# Patient Record
Sex: MALE | Race: WHITE | NOT HISPANIC OR LATINO | Employment: FULL TIME | RURAL
[De-identification: names, ages, dates, MRNs, and addresses within clinical notes are randomized per-mention and may not be internally consistent; named-entity substitution may affect disease eponyms.]

---

## 2022-08-16 ENCOUNTER — OFFICE VISIT (OUTPATIENT)
Dept: FAMILY MEDICINE CLINIC | Facility: CLINIC | Age: 37
End: 2022-08-16

## 2022-08-16 VITALS
RESPIRATION RATE: 20 BRPM | HEART RATE: 87 BPM | DIASTOLIC BLOOD PRESSURE: 70 MMHG | SYSTOLIC BLOOD PRESSURE: 124 MMHG | WEIGHT: 286.4 LBS | OXYGEN SATURATION: 98 % | BODY MASS INDEX: 38.79 KG/M2 | TEMPERATURE: 98.2 F | HEIGHT: 72 IN

## 2022-08-16 DIAGNOSIS — Z13.220 SCREENING FOR HYPERLIPIDEMIA: ICD-10-CM

## 2022-08-16 DIAGNOSIS — R53.83 FATIGUE, UNSPECIFIED TYPE: ICD-10-CM

## 2022-08-16 DIAGNOSIS — Z00.01 ENCOUNTER FOR GENERAL ADULT MEDICAL EXAMINATION WITH ABNORMAL FINDINGS: Primary | ICD-10-CM

## 2022-08-16 DIAGNOSIS — Z13.1 SCREENING FOR DIABETES MELLITUS: ICD-10-CM

## 2022-08-16 DIAGNOSIS — R20.2 PARESTHESIAS: ICD-10-CM

## 2022-08-16 DIAGNOSIS — B00.1 FEVER BLISTER: ICD-10-CM

## 2022-08-16 DIAGNOSIS — E55.9 VITAMIN D DEFICIENCY: ICD-10-CM

## 2022-08-16 DIAGNOSIS — E29.1 TESTICULAR HYPOFUNCTION: ICD-10-CM

## 2022-08-16 DIAGNOSIS — G47.19 EXCESSIVE DAYTIME SLEEPINESS: ICD-10-CM

## 2022-08-16 DIAGNOSIS — G56.22 NEURITIS OF LEFT ULNAR NERVE: ICD-10-CM

## 2022-08-16 DIAGNOSIS — N52.1 ERECTILE DYSFUNCTION DUE TO DISEASES CLASSIFIED ELSEWHERE: ICD-10-CM

## 2022-08-16 PROCEDURE — 99395 PREV VISIT EST AGE 18-39: CPT | Performed by: FAMILY MEDICINE

## 2022-08-16 PROCEDURE — 36415 COLL VENOUS BLD VENIPUNCTURE: CPT | Performed by: FAMILY MEDICINE

## 2022-08-16 RX ORDER — PREDNISONE 20 MG/1
TABLET ORAL
Qty: 15 TABLET | Refills: 0 | Status: SHIPPED | OUTPATIENT
Start: 2022-08-16 | End: 2023-02-16

## 2022-08-16 RX ORDER — VALACYCLOVIR HYDROCHLORIDE 1 G/1
1000 TABLET, FILM COATED ORAL AS NEEDED
COMMUNITY
Start: 2022-06-15

## 2022-08-16 RX ORDER — SILDENAFIL CITRATE 20 MG/1
TABLET ORAL
Qty: 50 TABLET | Refills: 2 | Status: SHIPPED | OUTPATIENT
Start: 2022-08-16

## 2022-08-17 PROBLEM — N52.1 ERECTILE DYSFUNCTION DUE TO DISEASES CLASSIFIED ELSEWHERE: Status: ACTIVE | Noted: 2022-08-17

## 2022-08-17 PROBLEM — B00.1 FEVER BLISTER: Status: ACTIVE | Noted: 2022-08-17

## 2022-08-17 PROBLEM — E29.1 TESTICULAR HYPOFUNCTION: Status: ACTIVE | Noted: 2022-08-17

## 2022-08-17 LAB
25(OH)D3+25(OH)D2 SERPL-MCNC: 35.2 NG/ML (ref 30–100)
ALBUMIN SERPL-MCNC: 4.7 G/DL (ref 4–5)
ALBUMIN/GLOB SERPL: 1.6 {RATIO} (ref 1.2–2.2)
ALP SERPL-CCNC: 70 IU/L (ref 44–121)
ALT SERPL-CCNC: 34 IU/L (ref 0–44)
AST SERPL-CCNC: 27 IU/L (ref 0–40)
BASOPHILS # BLD AUTO: 0.1 X10E3/UL (ref 0–0.2)
BASOPHILS NFR BLD AUTO: 1 %
BILIRUB SERPL-MCNC: 0.5 MG/DL (ref 0–1.2)
BUN SERPL-MCNC: 14 MG/DL (ref 6–20)
BUN/CREAT SERPL: 14 (ref 9–20)
CALCIUM SERPL-MCNC: 9.9 MG/DL (ref 8.7–10.2)
CHLORIDE SERPL-SCNC: 100 MMOL/L (ref 96–106)
CHOLEST SERPL-MCNC: 176 MG/DL (ref 100–199)
CO2 SERPL-SCNC: 22 MMOL/L (ref 20–29)
CREAT SERPL-MCNC: 1.02 MG/DL (ref 0.76–1.27)
EGFRCR-CYS SERPLBLD CKD-EPI 2021: 98 ML/MIN/1.73
EOSINOPHIL # BLD AUTO: 0.2 X10E3/UL (ref 0–0.4)
EOSINOPHIL NFR BLD AUTO: 3 %
ERYTHROCYTE [DISTWIDTH] IN BLOOD BY AUTOMATED COUNT: 12.6 % (ref 11.6–15.4)
GLOBULIN SER CALC-MCNC: 3 G/DL (ref 1.5–4.5)
GLUCOSE SERPL-MCNC: 101 MG/DL (ref 65–99)
HBA1C MFR BLD: 5.7 % (ref 4.8–5.6)
HCT VFR BLD AUTO: 47.7 % (ref 37.5–51)
HDLC SERPL-MCNC: 39 MG/DL
HGB BLD-MCNC: 16.2 G/DL (ref 13–17.7)
IMM GRANULOCYTES # BLD AUTO: 0 X10E3/UL (ref 0–0.1)
IMM GRANULOCYTES NFR BLD AUTO: 0 %
LDLC SERPL CALC-MCNC: 114 MG/DL (ref 0–99)
LYMPHOCYTES # BLD AUTO: 2.5 X10E3/UL (ref 0.7–3.1)
LYMPHOCYTES NFR BLD AUTO: 32 %
MCH RBC QN AUTO: 30.8 PG (ref 26.6–33)
MCHC RBC AUTO-ENTMCNC: 34 G/DL (ref 31.5–35.7)
MCV RBC AUTO: 91 FL (ref 79–97)
MONOCYTES # BLD AUTO: 0.6 X10E3/UL (ref 0.1–0.9)
MONOCYTES NFR BLD AUTO: 8 %
NEUTROPHILS # BLD AUTO: 4.4 X10E3/UL (ref 1.4–7)
NEUTROPHILS NFR BLD AUTO: 56 %
PLATELET # BLD AUTO: 336 X10E3/UL (ref 150–450)
POTASSIUM SERPL-SCNC: 5 MMOL/L (ref 3.5–5.2)
PROT SERPL-MCNC: 7.7 G/DL (ref 6–8.5)
RBC # BLD AUTO: 5.26 X10E6/UL (ref 4.14–5.8)
SODIUM SERPL-SCNC: 140 MMOL/L (ref 134–144)
TRIGL SERPL-MCNC: 126 MG/DL (ref 0–149)
TSH SERPL DL<=0.005 MIU/L-ACNC: 1.75 UIU/ML (ref 0.45–4.5)
VIT B12 SERPL-MCNC: 647 PG/ML (ref 232–1245)
VLDLC SERPL CALC-MCNC: 23 MG/DL (ref 5–40)
WBC # BLD AUTO: 7.7 X10E3/UL (ref 3.4–10.8)

## 2022-08-18 ENCOUNTER — TELEPHONE (OUTPATIENT)
Dept: FAMILY MEDICINE CLINIC | Facility: CLINIC | Age: 37
End: 2022-08-18

## 2022-08-18 NOTE — TELEPHONE ENCOUNTER
IF PATIENT CALLS BACK I LEFT HIM A VOICEMAIL:    PATIENT HAS APPOINTMENT 08/22/2022 @ 1:00PM.  PLEASE BE THERE @ 12:45PM AND BRING INSURANCE, PICTURE ID AND LIST OF MEDS.    DR. LUCITA HERNÁNDEZ  [address] [place] [address] [address], [place] [id number]   [phone]  LEFT PATIENT A VOICEMAIL.

## 2022-08-18 NOTE — PROGRESS NOTES
"Chief Complaint  Annual Exam    Subjective      Rufino Smallwood presents to [facility]  History of Present Illness    Here for annual exam, has not had on ein several yrs. Father has DM and recently found out cirrhosis due to fatty liver disease. Pt has noticed over past few yrs it has become increasingly difficult to lose weight, even with seriuos efforts to do so. He does get tired in the daytime if not up moving around, and feels the urge to nap, but never gets sleepy riding or driving, wife denies signif. Snoring, and does not feel sleepy if he remains engaged in work. We discussed possibly hving sleep apnea test but he declines for now, understands the risks and will let me know if he changes his mind. Only other complaint is that he fell playing basketball a year ago and has had pain in left elbow with recurrent sx of ulnar neuritis, bnurning/tingling in elbow that radiate down to left pinky and ring finger, but sx have gotten worse over past month. No decrease in  or neck pain  Objective   Vital Signs:   Vitals:    08/16/22 1352   BP: 124/70   Pulse: 87   Resp: 20   Temp: 98.2 °F (36.8 °C)   SpO2: 98%   Weight: 130 kg (286 lb 6.4 oz)   Height: 182.9 cm (72\")      /70   Pulse 87   Temp 98.2 °F (36.8 °C)   Resp 20   Ht 182.9 cm (72\")   Wt 130 kg (286 lb 6.4 oz)   SpO2 98%   BMI 38.84 kg/m²     Body mass index is 38.84 kg/m².    Review of Systems   Constitutional: Positive for fatigue and unexpected weight gain. Negative for chills, fever and unexpected weight loss.   HENT: Negative for ear discharge, ear pain, facial swelling, hearing loss, mouth sores, nosebleeds, rhinorrhea, sinus pressure, sore throat, swollen glands, tinnitus, trouble swallowing and voice change.    Eyes: Negative for blurred vision, double vision, pain, redness and visual disturbance.   Respiratory: Negative for cough, chest tightness, shortness of breath, wheezing and stridor.    Cardiovascular: " Negative for chest pain, palpitations and leg swelling.        PND, orthopnea   Gastrointestinal: Negative for abdominal distention, abdominal pain, anal bleeding, blood in stool, constipation, diarrhea, nausea, vomiting, GERD and indigestion.        Dysphagia, odynophagia   Endocrine: Negative for polydipsia, polyphagia and polyuria.   Genitourinary: Positive for erectile dysfunction. Negative for difficulty urinating, dysuria, frequency, hematuria, testicular pain, urgency and urinary incontinence.   Musculoskeletal: Positive for arthralgias (left elbow). Negative for back pain, gait problem, joint swelling, myalgias, neck pain and neck stiffness.   Skin: Negative for rash, skin lesions (worrisome/suspicious) and bruise.   Allergic/Immunologic: Negative for food allergies.   Neurological: Positive for numbness. Negative for dizziness, tremors, seizures, syncope, facial asymmetry, speech difficulty, weakness, light-headedness, headache, memory problem and confusion.   Hematological: Negative for adenopathy. Does not bruise/bleed easily.   Psychiatric/Behavioral: Negative for sleep disturbance, suicidal ideas and depressed mood. The patient is not nervous/anxious.        Past History:  Medical History: has a past medical history of Concussion, Fever blister, Headache, migraine, Testicular hypofunction, and Variants of migraine.   Surgical History: has a past surgical history that includes Appendectomy (1998) and Inguinal hernia repair.   Family History: family history includes Breast cancer in his mother; Cirrhosis in his father; Diabetes in his father; Migraines in his father; Sleep apnea in his father.   Social History: reports that he has never smoked. He has never used smokeless tobacco. He reports that he does not use drugs.      Current Outpatient Medications:   •  valACYclovir (VALTREX) 1000 MG tablet, , Disp: , Rfl:   •  metFORMIN (Glucophage) 500 MG tablet, Take 1 tablet by mouth 2 (Two) Times a Day With  Meals. For PREDIABETES, Disp: 180 tablet, Rfl: 3  •  predniSONE (DELTASONE) 20 MG tablet, Take 2 qd x 5d, then 1 qd x 5d w/food for inflammation, Disp: 15 tablet, Rfl: 0  •  sildenafil (REVATIO) 20 MG tablet, Take 1 to 5 (20mg to 100mg) about 1 hour before intercourse prn, Disp: 50 tablet, Rfl: 2    Allergies: Patient has no known allergies.    Physical Exam  Constitutional:       General: He is not in acute distress.     Appearance: He is obese. He is not toxic-appearing.   HENT:      Head: Normocephalic and atraumatic.      Right Ear: Tympanic membrane, ear canal and external ear normal.      Left Ear: Tympanic membrane, ear canal and external ear normal.      Nose: Nose normal.      Mouth/Throat:      Mouth: Mucous membranes are moist.      Pharynx: Oropharynx is clear.   Eyes:      General: No scleral icterus.     Extraocular Movements: Extraocular movements intact.      Conjunctiva/sclera: Conjunctivae normal.      Pupils: Pupils are equal, round, and reactive to light.   Neck:      Vascular: No carotid bruit.   Cardiovascular:      Rate and Rhythm: Normal rate and regular rhythm.      Pulses: Normal pulses.      Heart sounds: Normal heart sounds. No murmur heard.    No gallop.   Pulmonary:      Effort: Pulmonary effort is normal.      Breath sounds: Normal breath sounds. No wheezing or rales.   Chest:      Chest wall: No tenderness.   Abdominal:      General: Bowel sounds are normal. There is no distension.      Palpations: Abdomen is soft. There is no mass.      Tenderness: There is no abdominal tenderness. There is no guarding or rebound.   Musculoskeletal:         General: Tenderness (left elbow, positive Tinels over ulnar nv) present. No swelling or deformity. Normal range of motion.      Cervical back: Normal range of motion and neck supple. No rigidity or tenderness.      Right lower leg: No edema.      Left lower leg: No edema.   Lymphadenopathy:      Cervical: No cervical adenopathy.   Skin:      General: Skin is warm and dry.      Capillary Refill: Capillary refill takes less than 2 seconds.      Coloration: Skin is not pale.      Findings: No erythema or rash.   Neurological:      General: No focal deficit present.      Mental Status: He is alert and oriented to person, place, and time.      Cranial Nerves: No cranial nerve deficit.      Sensory: Sensory deficit (left forearm, unlar aspect) present.      Motor: No weakness.      Coordination: Coordination normal.      Gait: Gait normal.   Psychiatric:         Mood and Affect: Mood normal.         Behavior: Behavior normal.         Judgment: Judgment normal.          Result Review :                 Assessment and Plan   Diagnoses and all orders for this visit:    1. Encounter for general adult medical examination with abnormal findings (Primary)  -     CBC Auto Differential; Future  -     Comprehensive Metabolic Panel; Future  -     CBC Auto Differential  -     Comprehensive Metabolic Panel  Healthy lifestyle including appropriate diet , exercise, and weight reduction were discussed. Preventive health measures also discussed. Will check labs. Pt has sx of JOVON but says the sx wax and wane a bit and wishes to work harder on weight loss before having JOVON testing, and will let me kow if he changes his mind, understands the risks.   2. Screening for diabetes mellitus  -     Hemoglobin A1c; Future  -     Hemoglobin A1c    3. Screening for hyperlipidemia  -     Lipid Panel; Future  -     Lipid Panel    4. Fatigue, unspecified type  -     TSH; Future  -     TSH    5. Paresthesias  -     Vitamin B12; Future  -     Vitamin B12    6. Vitamin D deficiency  -     Vitamin D 25 Hydroxy; Future  -     Vitamin D 25 Hydroxy    7. Neuritis of left ulnar nerve  -     Ambulatory Referral to Orthopedic Surgery  Give prednisone taper, keep pressure off elbow, and refeer to orthopedic hand specialist as there is a good chance he will need intervention  8. Fever blister  Will call  when needs Valtrex refilled  9. Testicular hypofunction    10. Erectile dysfunction due to diseases classified elsewhere  Pt only has this sometimes, and uses sildenafil prn with good results  11. Excessive daytime sleepiness    Other orders  -     predniSONE (DELTASONE) 20 MG tablet; Take 2 qd x 5d, then 1 qd x 5d w/food for inflammation  Dispense: 15 tablet; Refill: 0  -     sildenafil (REVATIO) 20 MG tablet; Take 1 to 5 (20mg to 100mg) about 1 hour before intercourse prn  Dispense: 50 tablet; Refill: 2                 Follow Up   No follow-ups on file.  Patient was given instructions and counseling regarding his condition or for health maintenance advice. Please see specific information pulled into the AVS if appropriate.     Jewel Kendrick MD

## 2022-08-23 ENCOUNTER — PATIENT MESSAGE (OUTPATIENT)
Dept: FAMILY MEDICINE CLINIC | Facility: CLINIC | Age: 37
End: 2022-08-23

## 2023-02-16 ENCOUNTER — OFFICE VISIT (OUTPATIENT)
Dept: FAMILY MEDICINE CLINIC | Facility: CLINIC | Age: 38
End: 2023-02-16
Payer: COMMERCIAL

## 2023-02-16 VITALS
SYSTOLIC BLOOD PRESSURE: 130 MMHG | RESPIRATION RATE: 18 BRPM | DIASTOLIC BLOOD PRESSURE: 100 MMHG | HEIGHT: 72 IN | HEART RATE: 76 BPM | WEIGHT: 290 LBS | OXYGEN SATURATION: 97 % | BODY MASS INDEX: 39.28 KG/M2

## 2023-02-16 DIAGNOSIS — E66.01 MORBID (SEVERE) OBESITY DUE TO EXCESS CALORIES: ICD-10-CM

## 2023-02-16 DIAGNOSIS — G47.19 EXCESSIVE DAYTIME SLEEPINESS: ICD-10-CM

## 2023-02-16 DIAGNOSIS — R53.83 OTHER FATIGUE: ICD-10-CM

## 2023-02-16 DIAGNOSIS — R73.03 PREDIABETES: Primary | ICD-10-CM

## 2023-02-16 DIAGNOSIS — Z79.899 ENCOUNTER FOR LONG-TERM (CURRENT) USE OF OTHER MEDICATIONS: ICD-10-CM

## 2023-02-16 DIAGNOSIS — R03.0 ELEVATED BLOOD PRESSURE READING IN OFFICE WITHOUT DIAGNOSIS OF HYPERTENSION: ICD-10-CM

## 2023-02-16 DIAGNOSIS — F41.1 GENERALIZED ANXIETY DISORDER: ICD-10-CM

## 2023-02-16 DIAGNOSIS — N52.9 ERECTILE DYSFUNCTION, UNSPECIFIED ERECTILE DYSFUNCTION TYPE: ICD-10-CM

## 2023-02-16 DIAGNOSIS — E78.5 DYSLIPIDEMIA: ICD-10-CM

## 2023-02-16 PROCEDURE — 99214 OFFICE O/P EST MOD 30 MIN: CPT | Performed by: FAMILY MEDICINE

## 2023-02-16 RX ORDER — METFORMIN HYDROCHLORIDE 500 MG/1
500 TABLET, EXTENDED RELEASE ORAL 2 TIMES DAILY WITH MEALS
Qty: 180 TABLET | Refills: 3 | Status: SHIPPED | OUTPATIENT
Start: 2023-02-16

## 2023-02-16 RX ORDER — NALTREXONE HYDROCHLORIDE AND BUPROPION HYDROCHLORIDE 8; 90 MG/1; MG/1
2 TABLET, EXTENDED RELEASE ORAL 2 TIMES DAILY
Qty: 120 TABLET | Refills: 5 | Status: SHIPPED | OUTPATIENT
Start: 2023-02-16

## 2023-02-17 NOTE — PROGRESS NOTES
Chief Complaint  Anxiety    Subjective      Rufino Smallwood presents to [facility]  History of Present Illness  Patient was here for annual physical about 6 months ago, and labs revealed prediabetes with an A1c of 5.7 and fasting glucose of 101, along with dyslipidemia including low HDL and mildly elevated LDL, although triglycerides were not bad.  Patient has a pretty strong family history of diabetes and obesity, pros and cons of metformin therapy were discussed and he contacted me and stated he wished to start on metformin therapy.  He took the prescription for several months and did not have significant trouble tolerating it, other than a little bit of nausea and loose stool when he first started, although we did not see any changes in his weight.  The patient was supposed to come in for follow-up but got a bit delayed so he ran out of the medicine about 2 months ago, and does wish to resume this.  However, he also says over the last couple of months he has tried very diligently to eat healthier, to watch his portion control, taking in fewer calories, and exercise vigorously 6 or 7 days a week, but in spite of that he is actually gained a few pounds rather than losing weight.  He says his wife is an  and they have extensive exercise equipment in the garage that he uses daily with her guidance, but he continues to shaffer his weight in spite of multiple different dietary approaches over the years including low-carb diet, reduce calorie diet, low-fat diet.  He is therefore interested in trying medically assisted weight loss, but does not wish to do bariatric surgery at this time.  Treatment options were discussed, pros and cons of each and risk and benefits of each were reviewed, and he would like to try Contrave.  He denies any history of seizures.    The patient also says that over the last few months he and his wife have talked about his ongoing anxiety issues, and he  "does not have panic disorder, but does feel anxious and stressed quite a bit.  Some of his occupational, but a lot of it has been something he is struggled with most of his adult life.  He denies depression or sadness, but gets angered and irritable at times over things that do not seem to warrant that.  He has not had any symptoms that sound like alexander or hypomania, but just feels nervous and worries a lot.  This seems to be something that runs in his family.  Denies any history of substance abuse and is interested in taking some medication for this that he does not think that he needs counseling at this time.  Treatment options were discussed, and while I told him we would typically use an SSRI or SNRI for this, or possibly BuSpar, a lot of his anxiety relates to his health concerns and his inability to lose weight, and he and I are both reluctant to start on 2 different medicines at the same time.  Since the Contrave has bupropion in it, he wishes to wait until he is taken it for a month or 2, and then see how he feels, and then will contact me if he wishes to start on sertraline after that.  I explained that bupropion is typically used for depression and not for anxiety, but interestingly a lot of patients do find that their mood overall is better with bupropion, and if he achieves some success at weight reduction it may relieve some of his anxiety.  Objective   Vital Signs:   Vitals:    02/16/23 0809   BP: 130/100   Pulse: 76   Resp: 18   SpO2: 97%   Weight: 132 kg (290 lb)   Height: 182.9 cm (72\")      /100   Pulse 76   Resp 18   Ht 182.9 cm (72\")   Wt 132 kg (290 lb)   SpO2 97%   BMI 39.33 kg/m²     Body mass index is 39.33 kg/m².    Review of Systems   Constitutional: Negative for appetite change, chills and fever.   HENT: Negative for sinus pressure and sore throat.    Respiratory: Negative for cough and shortness of breath.    Cardiovascular: Negative for chest pain and leg swelling. "   Gastrointestinal: Negative for abdominal pain, diarrhea and GERD.   Genitourinary: Negative for dysuria, frequency and hematuria.   Musculoskeletal: Negative for arthralgias, back pain, joint swelling and myalgias.   Skin: Negative for rash.   Neurological: Negative for dizziness, tremors, seizures, syncope, weakness, light-headedness, numbness, headache and memory problem.   Psychiatric/Behavioral: Negative for suicidal ideas and depressed mood. The patient is nervous/anxious.        Past History:  Medical History: has a past medical history of Concussion, Fever blister, Headache, migraine, Testicular hypofunction, and Variants of migraine.   Surgical History: has a past surgical history that includes Appendectomy (1998) and Inguinal hernia repair.   Family History: family history includes Breast cancer in his mother; Cirrhosis in his father; Diabetes in his father; Migraines in his father; Sleep apnea in his father.   Social History: reports that he has never smoked. He has never used smokeless tobacco. He reports that he does not use drugs.      Current Outpatient Medications:   •  sildenafil (REVATIO) 20 MG tablet, Take 1 to 5 (20mg to 100mg) about 1 hour before intercourse prn, Disp: 50 tablet, Rfl: 2  •  valACYclovir (VALTREX) 1000 MG tablet, Take 1,000 mg by mouth As Needed., Disp: , Rfl:   •  metFORMIN ER (GLUCOPHAGE-XR) 500 MG 24 hr tablet, Take 1 tablet by mouth 2 (Two) Times a Day With Meals., Disp: 180 tablet, Rfl: 3  •  naltrexone-bupropion ER (CONTRAVE) 8-90 MG tablet, Wk 1: 1 tab daily, Wk 2: 1 tab twice a day, Wk 3: 2 tabs in AM, 1 tab in PM, Wk 4: 2 tabs twice a day, Maintenance dose: 2 tabs twice daily., Disp: 70 tablet, Rfl: 0  •  naltrexone-bupropion ER (Contrave) 8-90 MG tablet, Take 2 tablets by mouth 2 (Two) Times a Day., Disp: 120 tablet, Rfl: 5    Allergies: Patient has no known allergies.    Physical Exam  Constitutional:       General: He is not in acute distress.     Appearance: He is  obese. He is not toxic-appearing.   HENT:      Head: Normocephalic and atraumatic.      Right Ear: Ear canal and external ear normal.      Left Ear: Ear canal and external ear normal.      Nose: Nose normal.      Mouth/Throat:      Mouth: Mucous membranes are moist.      Pharynx: Oropharynx is clear.   Eyes:      General: No scleral icterus.     Extraocular Movements: Extraocular movements intact.      Conjunctiva/sclera: Conjunctivae normal.      Pupils: Pupils are equal, round, and reactive to light.   Neck:      Vascular: No carotid bruit.   Cardiovascular:      Rate and Rhythm: Normal rate and regular rhythm.      Pulses: Normal pulses.      Heart sounds: Normal heart sounds.   Pulmonary:      Effort: Pulmonary effort is normal.      Breath sounds: Normal breath sounds.   Chest:      Chest wall: No tenderness.   Abdominal:      General: Bowel sounds are normal. There is no distension.      Palpations: Abdomen is soft.      Tenderness: There is no abdominal tenderness. There is no guarding or rebound.   Musculoskeletal:         General: No swelling or deformity. Normal range of motion.      Cervical back: Normal range of motion. No rigidity.      Right lower leg: No edema.      Left lower leg: No edema.   Lymphadenopathy:      Cervical: No cervical adenopathy.   Skin:     General: Skin is warm and dry.      Capillary Refill: Capillary refill takes less than 2 seconds.   Neurological:      General: No focal deficit present.      Mental Status: He is alert and oriented to person, place, and time.      Cranial Nerves: No cranial nerve deficit.      Motor: No weakness.      Coordination: Coordination normal.      Gait: Gait normal.   Psychiatric:         Mood and Affect: Mood normal.         Behavior: Behavior normal.         Judgment: Judgment normal.                   Assessment and Plan   Diagnoses and all orders for this visit:    1. Prediabetes (Primary)  Patient says he ran out of metformin a couple of months  ago so he wishes to hold off on doing additional lab work at this time, and I do not think there is any compelling reason to repeat anything today.  He will get back on metformin regularly, and he will start the Contrave and titrate the dose up as instructed over a month to 2 pills twice a day.  If the medicine is not tolerated well he will contact me and stop taking it.  If it is tolerated well, then I told him that he needs to at least lose 12 pounds in the next 3 months, and if not then we need to look for alternative therapies.  2. Morbid (severe) obesity due to excess calories (HCC)  See above, patient has tried lifestyle modification vigorously and multiple times without any clinically significant weight loss, so definitely would benefit from medically assisted weight loss  3. Generalized anxiety disorder  As above, he will start on the Contrave, and if his mood is okay he will just continue taking that, but if he continues to have significant anxiety and irritability symptoms that he will let me know and we can start sertraline 50 mg daily, which I told him I would call him.  If he begins to have depression symptoms he will return.  Side effects and risk of medicine were discussed, and treatment options and alternatives were discussed as well  4. Elevated blood pressure reading in office without diagnosis of hypertension  Blood pressure was perfectly normal at his appointment 6 months ago and the patient admits that he was very anxious and stressed the day, so he wishes to work on lifestyle modification and we will monitor this.  If the blood pressure remains 140/90 or higher consistently he will let me know so we can start medication.  5. Encounter for long-term (current) use of other medications    6. Dyslipidemia    7. Erectile dysfunction, unspecified erectile dysfunction type  Patient does well with sildenafil as needed  8. Other fatigue  Patient is still having sleep apnea symptoms but really wants to  work on weight loss before having a sleep study, so we will keep a close eye on this and see how he is feeling at his next appointment in 6 months and repeat labs then  9. Excessive daytime sleepiness    Other orders  -     naltrexone-bupropion ER (CONTRAVE) 8-90 MG tablet; Wk 1: 1 tab daily, Wk 2: 1 tab twice a day, Wk 3: 2 tabs in AM, 1 tab in PM, Wk 4: 2 tabs twice a day, Maintenance dose: 2 tabs twice daily.  Dispense: 70 tablet; Refill: 0  -     naltrexone-bupropion ER (Contrave) 8-90 MG tablet; Take 2 tablets by mouth 2 (Two) Times a Day.  Dispense: 120 tablet; Refill: 5  -     metFORMIN ER (GLUCOPHAGE-XR) 500 MG 24 hr tablet; Take 1 tablet by mouth 2 (Two) Times a Day With Meals.  Dispense: 180 tablet; Refill: 3            Follow Up   No follow-ups on file.  Patient was given instructions and counseling regarding his condition or for health maintenance advice. Please see specific information pulled into the AVS if appropriate.     Jewel Kendrick MD

## 2023-08-18 RX ORDER — SILDENAFIL CITRATE 20 MG/1
TABLET ORAL
Qty: 50 TABLET | Refills: 1 | OUTPATIENT
Start: 2023-08-18

## 2023-08-21 RX ORDER — SILDENAFIL CITRATE 20 MG/1
TABLET ORAL
Qty: 50 TABLET | Refills: 1 | OUTPATIENT
Start: 2023-08-21

## 2023-08-22 ENCOUNTER — TELEPHONE (OUTPATIENT)
Dept: FAMILY MEDICINE CLINIC | Facility: CLINIC | Age: 38
End: 2023-08-22
Payer: COMMERCIAL

## 2023-08-22 RX ORDER — SILDENAFIL CITRATE 20 MG/1
TABLET ORAL
Qty: 50 TABLET | Refills: 2 | OUTPATIENT
Start: 2023-08-22

## 2023-08-22 RX ORDER — SILDENAFIL CITRATE 20 MG/1
TABLET ORAL
Qty: 50 TABLET | Refills: 2 | Status: SHIPPED | OUTPATIENT
Start: 2023-08-22

## 2023-09-01 ENCOUNTER — OFFICE VISIT (OUTPATIENT)
Dept: FAMILY MEDICINE CLINIC | Facility: CLINIC | Age: 38
End: 2023-09-01
Payer: COMMERCIAL

## 2023-09-01 VITALS
WEIGHT: 279 LBS | HEART RATE: 98 BPM | BODY MASS INDEX: 37.79 KG/M2 | DIASTOLIC BLOOD PRESSURE: 88 MMHG | OXYGEN SATURATION: 96 % | HEIGHT: 72 IN | SYSTOLIC BLOOD PRESSURE: 130 MMHG

## 2023-09-01 DIAGNOSIS — Z13.220 SCREENING FOR HYPERLIPIDEMIA: ICD-10-CM

## 2023-09-01 DIAGNOSIS — N52.9 ERECTILE DYSFUNCTION, UNSPECIFIED ERECTILE DYSFUNCTION TYPE: ICD-10-CM

## 2023-09-01 DIAGNOSIS — R03.0 ELEVATED BLOOD PRESSURE READING IN OFFICE WITHOUT DIAGNOSIS OF HYPERTENSION: ICD-10-CM

## 2023-09-01 DIAGNOSIS — Z00.01 ENCOUNTER FOR GENERAL ADULT MEDICAL EXAMINATION WITH ABNORMAL FINDINGS: Primary | ICD-10-CM

## 2023-09-01 DIAGNOSIS — Z79.899 ENCOUNTER FOR LONG-TERM (CURRENT) USE OF OTHER MEDICATIONS: ICD-10-CM

## 2023-09-01 DIAGNOSIS — Z13.1 SCREENING FOR DIABETES MELLITUS: ICD-10-CM

## 2023-09-01 DIAGNOSIS — E66.01 MORBID (SEVERE) OBESITY DUE TO EXCESS CALORIES: ICD-10-CM

## 2023-09-01 DIAGNOSIS — R73.03 PREDIABETES: ICD-10-CM

## 2023-09-01 DIAGNOSIS — B00.1 FEVER BLISTER: ICD-10-CM

## 2023-09-01 DIAGNOSIS — R53.83 OTHER FATIGUE: ICD-10-CM

## 2023-09-01 DIAGNOSIS — Z11.59 NEED FOR HEPATITIS C SCREENING TEST: ICD-10-CM

## 2023-09-01 RX ORDER — SILDENAFIL CITRATE 20 MG/1
TABLET ORAL
Qty: 50 TABLET | Refills: 12 | Status: SHIPPED | OUTPATIENT
Start: 2023-09-01

## 2023-09-01 RX ORDER — SEMAGLUTIDE 0.25 MG/.5ML
0.25 INJECTION, SOLUTION SUBCUTANEOUS WEEKLY
Qty: 2 ML | Refills: 0 | Status: SHIPPED | OUTPATIENT
Start: 2023-09-01

## 2023-09-01 RX ORDER — VALACYCLOVIR HYDROCHLORIDE 1 G/1
TABLET, FILM COATED ORAL
Qty: 12 TABLET | Refills: 3 | Status: SHIPPED | OUTPATIENT
Start: 2023-09-01

## 2023-09-02 LAB
ALBUMIN SERPL-MCNC: 4.6 G/DL (ref 4.1–5.1)
ALBUMIN/GLOB SERPL: 1.5 {RATIO} (ref 1.2–2.2)
ALP SERPL-CCNC: 72 IU/L (ref 44–121)
ALT SERPL-CCNC: 33 IU/L (ref 0–44)
AST SERPL-CCNC: 20 IU/L (ref 0–40)
BASOPHILS # BLD AUTO: 0 X10E3/UL (ref 0–0.2)
BASOPHILS NFR BLD AUTO: 1 %
BILIRUB SERPL-MCNC: 0.7 MG/DL (ref 0–1.2)
BUN SERPL-MCNC: 13 MG/DL (ref 6–20)
BUN/CREAT SERPL: 13 (ref 9–20)
CALCIUM SERPL-MCNC: 9.5 MG/DL (ref 8.7–10.2)
CHLORIDE SERPL-SCNC: 101 MMOL/L (ref 96–106)
CHOLEST SERPL-MCNC: 168 MG/DL (ref 100–199)
CO2 SERPL-SCNC: 22 MMOL/L (ref 20–29)
CREAT SERPL-MCNC: 1.02 MG/DL (ref 0.76–1.27)
EGFRCR SERPLBLD CKD-EPI 2021: 97 ML/MIN/1.73
EOSINOPHIL # BLD AUTO: 0.1 X10E3/UL (ref 0–0.4)
EOSINOPHIL NFR BLD AUTO: 2 %
ERYTHROCYTE [DISTWIDTH] IN BLOOD BY AUTOMATED COUNT: 12.4 % (ref 11.6–15.4)
GLOBULIN SER CALC-MCNC: 3 G/DL (ref 1.5–4.5)
GLUCOSE SERPL-MCNC: 90 MG/DL (ref 70–99)
HBA1C MFR BLD: 5.4 % (ref 4.8–5.6)
HCT VFR BLD AUTO: 43.3 % (ref 37.5–51)
HCV IGG SERPL QL IA: NON REACTIVE
HDLC SERPL-MCNC: 42 MG/DL
HGB BLD-MCNC: 14.8 G/DL (ref 13–17.7)
IMM GRANULOCYTES # BLD AUTO: 0 X10E3/UL (ref 0–0.1)
IMM GRANULOCYTES NFR BLD AUTO: 0 %
LDLC SERPL CALC-MCNC: 107 MG/DL (ref 0–99)
LYMPHOCYTES # BLD AUTO: 2.2 X10E3/UL (ref 0.7–3.1)
LYMPHOCYTES NFR BLD AUTO: 30 %
Lab: NORMAL
MCH RBC QN AUTO: 30.5 PG (ref 26.6–33)
MCHC RBC AUTO-ENTMCNC: 34.2 G/DL (ref 31.5–35.7)
MCV RBC AUTO: 89 FL (ref 79–97)
MONOCYTES # BLD AUTO: 0.5 X10E3/UL (ref 0.1–0.9)
MONOCYTES NFR BLD AUTO: 7 %
NEUTROPHILS # BLD AUTO: 4.4 X10E3/UL (ref 1.4–7)
NEUTROPHILS NFR BLD AUTO: 60 %
PLATELET # BLD AUTO: 327 X10E3/UL (ref 150–450)
POTASSIUM SERPL-SCNC: 4.2 MMOL/L (ref 3.5–5.2)
PROT SERPL-MCNC: 7.6 G/DL (ref 6–8.5)
RBC # BLD AUTO: 4.86 X10E6/UL (ref 4.14–5.8)
SODIUM SERPL-SCNC: 140 MMOL/L (ref 134–144)
T4 FREE SERPL-MCNC: 1.16 NG/DL (ref 0.82–1.77)
TRIGL SERPL-MCNC: 105 MG/DL (ref 0–149)
TSH SERPL DL<=0.005 MIU/L-ACNC: 1.72 UIU/ML (ref 0.45–4.5)
VLDLC SERPL CALC-MCNC: 19 MG/DL (ref 5–40)
WBC # BLD AUTO: 7.2 X10E3/UL (ref 3.4–10.8)

## 2023-09-02 NOTE — PROGRESS NOTES
"Chief Complaint  Annual Exam    Subjective      Rufino Smallwood presents to [facility]  History of Present Illness  Patient is here for annual exam.  He says he has been feeling very well lately.  He has been taking Contrave for several months, and has lost 11 pounds.  He says that just barely takes the edge off his appetite, but has not achieved as much weight loss as he had hoped.  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  He was taking metformin for prediabetes, and even though he was on the extended release version, the GI upset became too severe and he had to stop taking it.  His ED is well controlled with sildenafil.  Objective   Vital Signs:   Vitals:    09/01/23 1514   BP: 130/88   BP Location: Left arm   Patient Position: Sitting   Cuff Size: Adult   Pulse: 98   SpO2: 96%   Weight: 127 kg (279 lb)   Height: 182.9 cm (72\")      /88 (BP Location: Left arm, Patient Position: Sitting, Cuff Size: Adult)   Pulse 98   Ht 182.9 cm (72\")   Wt 127 kg (279 lb)   SpO2 96%   BMI 37.84 kg/mý     Body mass index is 37.84 kg/mý.    Review of Systems   Constitutional:  Positive for activity change. Negative for appetite change, chills, fever and unexpected weight loss.   HENT:  Negative for ear discharge, ear pain, mouth sores, nosebleeds, rhinorrhea, sinus pressure, sore throat, swollen glands, trouble swallowing and voice change.    Eyes:  Negative for blurred vision, double vision, pain, redness and visual disturbance.   Respiratory:  Negative for cough, choking, chest tightness, shortness of breath and wheezing.    Cardiovascular:  Negative for chest pain, palpitations and leg swelling.        PND, orthopnea   Gastrointestinal:  Negative for abdominal distention, abdominal pain, blood in stool, constipation, diarrhea, nausea, vomiting and GERD.        " Dysphagia, odynophagia   Endocrine: Negative for polydipsia, polyphagia and polyuria.   Genitourinary:  Negative for dysuria, hematuria and urinary incontinence.   Musculoskeletal:  Negative for arthralgias (unusual/atypica), back pain, gait problem, joint swelling and myalgias.   Skin:  Negative for rash, skin lesions (worrisome/suspicious) and bruise.   Allergic/Immunologic: Negative for food allergies.   Neurological:  Negative for dizziness, tremors, seizures, syncope, weakness, light-headedness, numbness, headache and memory problem.   Hematological:  Negative for adenopathy. Does not bruise/bleed easily.   Psychiatric/Behavioral:  Negative for suicidal ideas and depressed mood. The patient is not nervous/anxious.      Past History:  Medical History: has a past medical history of Concussion, Erectile dysfunction, Fever blister, Headache, migraine, Prediabetes (2022), Testicular hypofunction, and Variants of migraine.   Surgical History: has a past surgical history that includes Appendectomy (1998) and Inguinal hernia repair.   Family History: family history includes Breast cancer in his mother; Cirrhosis in his father; Diabetes in his father; Migraines in his father; Sleep apnea in his father.   Social History: reports that he has never smoked. He has never used smokeless tobacco. He reports that he does not currently use alcohol. He reports that he does not use drugs.      Current Outpatient Medications:     sildenafil (REVATIO) 20 MG tablet, Take 1 to 5 (20mg to 100mg) about 1 hour before intercourse prn, Disp: 50 tablet, Rfl: 12    valACYclovir (VALTREX) 1000 MG tablet, Take 2 po bid x 1 day PRN fever blisters, Disp: 12 tablet, Rfl: 3    Semaglutide-Weight Management (Wegovy) 0.25 MG/0.5ML solution auto-injector, Inject 0.25 mg under the skin into the appropriate area as directed 1 (One) Time Per Week., Disp: 2 mL, Rfl: 0    Allergies: Patient has no known allergies.    Physical Exam  Constitutional:        General: He is not in acute distress.     Appearance: He is obese. He is not toxic-appearing.   HENT:      Head: Normocephalic and atraumatic.      Right Ear: Tympanic membrane, ear canal and external ear normal.      Left Ear: Tympanic membrane, ear canal and external ear normal.      Nose: Nose normal.      Mouth/Throat:      Mouth: Mucous membranes are moist.      Pharynx: Oropharynx is clear.   Eyes:      General: No scleral icterus.     Extraocular Movements: Extraocular movements intact.      Conjunctiva/sclera: Conjunctivae normal.      Pupils: Pupils are equal, round, and reactive to light.   Neck:      Vascular: No carotid bruit.   Cardiovascular:      Rate and Rhythm: Normal rate and regular rhythm.      Pulses: Normal pulses.      Heart sounds: Normal heart sounds.   Pulmonary:      Effort: Pulmonary effort is normal.      Breath sounds: Normal breath sounds.   Chest:      Chest wall: No tenderness.   Abdominal:      General: Bowel sounds are normal. There is no distension.      Palpations: Abdomen is soft. There is no mass.      Tenderness: There is no abdominal tenderness. There is no guarding or rebound.   Musculoskeletal:         General: No swelling or deformity. Normal range of motion.      Cervical back: Normal range of motion. No rigidity.      Right lower leg: No edema.      Left lower leg: No edema.   Lymphadenopathy:      Cervical: No cervical adenopathy.   Skin:     General: Skin is warm and dry.      Capillary Refill: Capillary refill takes less than 2 seconds.      Coloration: Skin is not pale.      Findings: No erythema or rash.   Neurological:      General: No focal deficit present.      Mental Status: He is alert and oriented to person, place, and time.      Cranial Nerves: No cranial nerve deficit.      Motor: No weakness.      Coordination: Coordination normal.      Gait: Gait normal.   Psychiatric:         Mood and Affect: Mood normal.         Behavior: Behavior normal.         Thought  Content: Thought content normal.         Judgment: Judgment normal.                 Assessment and Plan   Diagnoses and all orders for this visit:    1. Encounter for general adult medical examination with abnormal findings (Primary)  -     CBC & Differential  -     Comprehensive Metabolic Panel  -     TSH+Free T4  Healthy lifestyle measures including healthy diet regular exercise and weight reduction were discussed.  Preventive healthcare measures were also discussed.  2. Screening for hyperlipidemia  -     Lipid Panel    3. Screening for diabetes mellitus  -     Hemoglobin A1c    4. Prediabetes  Management was discussed, with the emphasis being on healthy diet regular exercise and weight control  5. Fever blister  Does fine with as needed Valtrex  6. Erectile dysfunction, unspecified erectile dysfunction type  Well-controlled with sildenafil and no cardiovascular symptoms  7. Encounter for long-term (current) use of other medications    8. Other fatigue  -     TSH+Free T4    9. Need for hepatitis C screening test  -     Hepatitis C Antibody    10. Elevated blood pressure reading in office without diagnosis of hypertension  He says his blood pressures usually been okay at home, but I did recheck and got 132/92, so he was encouraged to continue monitoring this, and if it does not stay below 140/90 consistently he will let me know  11. Morbid (severe) obesity due to excess calories  Patient has lost 11 pounds since taking Contrave, and is working diligently to eat a healthy diet, but needs to get back and exercising.  Since the amount of weight loss from the Contrave is only 11 pounds that he has been on it for 6 months or longer, we discussed the fact that it does not really make sense to continue taking it.  The patient was interested in trying Wegovy, denies any personal or family history of medullary thyroid carcinoma or pancreatitis, side effects and risk were discussed, and he wishes to proceed.  The cost was  discussed.  Hopefully his insurance will cover it, and a prescription was sent to the pharmacy.  He will call back every 3 weeks to let me know if he is ready to uptitrate the dose.  If he does not tolerate it that he will stop.  I will check labs and plan on seeing him back in 1 year, but he will let me know if there are any problems beforehand, which she has been very good about doing  Other orders  -     Semaglutide-Weight Management (Wegovy) 0.25 MG/0.5ML solution auto-injector; Inject 0.25 mg under the skin into the appropriate area as directed 1 (One) Time Per Week.  Dispense: 2 mL; Refill: 0  -     sildenafil (REVATIO) 20 MG tablet; Take 1 to 5 (20mg to 100mg) about 1 hour before intercourse prn  Dispense: 50 tablet; Refill: 12  -     valACYclovir (VALTREX) 1000 MG tablet; Take 2 po bid x 1 day PRN fever blisters  Dispense: 12 tablet; Refill: 3  -     Please Note            Follow Up   No follow-ups on file.  Patient was given instructions and counseling regarding his condition or for health maintenance advice. Please see specific information pulled into the AVS if appropriate.     Jewel Kendrick MD

## 2023-10-03 RX ORDER — NALTREXONE HYDROCHLORIDE AND BUPROPION HYDROCHLORIDE 8; 90 MG/1; MG/1
TABLET, EXTENDED RELEASE ORAL
Qty: 120 TABLET | Refills: 4 | OUTPATIENT
Start: 2023-10-03

## 2024-05-13 ENCOUNTER — TELEPHONE (OUTPATIENT)
Dept: FAMILY MEDICINE CLINIC | Facility: CLINIC | Age: 39
End: 2024-05-13
Payer: COMMERCIAL

## 2024-05-13 NOTE — TELEPHONE ENCOUNTER
Caller: Rufino Smallwood    Relationship to patient: Self    Best call back number:     [phone]        Chief complaint: PATIENT STATED THAT FOR THE LAST 48 HOURS HE HAS BEEN PASSING A LOT OF BLOOD IN HIS STOOL WITH ABDOMINAL PAIN. PATIENT WAS INFORMED TO CALL 911 OR GO BE SEEN AT THE HOSPITAL. PATIENT STATED THAT HE WAS GOING TO BE SEEN AT THE HOSPITAL     Patient directed to call 911 or go to their nearest emergency room.     Patient verbalized understanding: [x] Yes  [] No

## 2025-01-07 ENCOUNTER — OFFICE VISIT (OUTPATIENT)
Dept: FAMILY MEDICINE CLINIC | Facility: CLINIC | Age: 40
End: 2025-01-07
Payer: COMMERCIAL

## 2025-01-07 VITALS
SYSTOLIC BLOOD PRESSURE: 128 MMHG | BODY MASS INDEX: 40.63 KG/M2 | DIASTOLIC BLOOD PRESSURE: 82 MMHG | HEART RATE: 77 BPM | OXYGEN SATURATION: 98 % | HEIGHT: 72 IN | WEIGHT: 300 LBS

## 2025-01-07 DIAGNOSIS — B00.1 FEVER BLISTER: ICD-10-CM

## 2025-01-07 DIAGNOSIS — E66.01 MORBID (SEVERE) OBESITY DUE TO EXCESS CALORIES: ICD-10-CM

## 2025-01-07 DIAGNOSIS — N52.9 ERECTILE DYSFUNCTION, UNSPECIFIED ERECTILE DYSFUNCTION TYPE: ICD-10-CM

## 2025-01-07 DIAGNOSIS — Z79.899 ENCOUNTER FOR LONG-TERM (CURRENT) USE OF HIGH-RISK MEDICATION: ICD-10-CM

## 2025-01-07 DIAGNOSIS — E78.5 DYSLIPIDEMIA: ICD-10-CM

## 2025-01-07 DIAGNOSIS — R73.03 PREDIABETES: ICD-10-CM

## 2025-01-07 DIAGNOSIS — R53.83 OTHER FATIGUE: ICD-10-CM

## 2025-01-07 DIAGNOSIS — Z00.01 ENCOUNTER FOR GENERAL ADULT MEDICAL EXAMINATION WITH ABNORMAL FINDINGS: Primary | ICD-10-CM

## 2025-01-07 PROCEDURE — 99395 PREV VISIT EST AGE 18-39: CPT | Performed by: FAMILY MEDICINE

## 2025-01-07 RX ORDER — VALACYCLOVIR HYDROCHLORIDE 1 G/1
TABLET, FILM COATED ORAL
Qty: 12 TABLET | Refills: 3 | Status: SHIPPED | OUTPATIENT
Start: 2025-01-07

## 2025-01-07 RX ORDER — SILDENAFIL CITRATE 20 MG/1
TABLET ORAL
Qty: 50 TABLET | Refills: 12 | Status: SHIPPED | OUTPATIENT
Start: 2025-01-07

## 2025-01-07 NOTE — PROGRESS NOTES
"Chief Complaint  Annual Exam    Subjective      Rufino Smallwood presents to [facility]  History of Present Illness  Patient is here for annual exam.  He says he has been feeling well and denies any new complaints.  He does continue to struggle with his weight, and while he did well with Wegovy for a few months, his insurance never approved it and he said he simply could not afford to continue using it.  The sildenafil is working well for his ED and he denies any cardiovascular symptoms.  He does still occasionally get a fever blister and uses Valtrex for this sometimes, so we will send a refill to the pharmacy.  Objective   Vital Signs:   Vitals:    01/07/25 1325   BP: 128/82   BP Location: Left arm   Patient Position: Sitting   Cuff Size: Large Adult   Pulse: 77   SpO2: 98%   Weight: 136 kg (300 lb)   Height: 182.9 cm (72\")      /82 (BP Location: Left arm, Patient Position: Sitting, Cuff Size: Large Adult)   Pulse 77   Ht 182.9 cm (72\")   Wt 136 kg (300 lb)   SpO2 98%   BMI 40.69 kg/m²     Body mass index is 40.69 kg/m².    Review of Systems   Constitutional:  Negative for chills, fever and unexpected weight loss.   HENT:  Negative for ear discharge, ear pain, mouth sores, nosebleeds, rhinorrhea, sinus pressure, sore throat, swollen glands and trouble swallowing.    Eyes:  Negative for blurred vision, double vision, pain, redness and visual disturbance.   Respiratory:  Negative for cough, chest tightness, shortness of breath and wheezing.    Cardiovascular:  Negative for chest pain, palpitations and leg swelling.        PND, orthopnea   Gastrointestinal:  Negative for abdominal distention, abdominal pain, blood in stool, constipation, diarrhea, nausea, vomiting and GERD.        Dysphagia, odynophagia   Endocrine: Negative for polydipsia, polyphagia and polyuria.   Genitourinary:  Negative for dysuria, hematuria and urinary incontinence.   Musculoskeletal:  Negative for " arthralgias (unusual/atypica), back pain, gait problem, joint swelling and myalgias.   Skin:  Negative for rash, skin lesions (worrisome/suspicious) and bruise.   Allergic/Immunologic: Negative for food allergies.   Neurological:  Negative for dizziness, tremors, seizures, syncope, weakness, light-headedness, numbness and headache.   Hematological:  Negative for adenopathy. Does not bruise/bleed easily.   Psychiatric/Behavioral:  Negative for sleep disturbance, suicidal ideas and depressed mood. The patient is not nervous/anxious.        Past History:  Medical History: has a past medical history of Concussion, Erectile dysfunction, Fever blister, Headache, migraine, Prediabetes (2022), Testicular hypofunction, and Variants of migraine.   Surgical History: has a past surgical history that includes Appendectomy (1998) and Inguinal hernia repair.   Family History: family history includes Breast cancer in his mother; Cirrhosis in his father; Diabetes in his father; Migraines in his father; Sleep apnea in his father.   Social History: reports that he has never smoked. He has never used smokeless tobacco. He reports that he does not currently use alcohol. He reports that he does not use drugs.      Current Outpatient Medications:     sildenafil (REVATIO) 20 MG tablet, Take 1 to 5 (20mg to 100mg) about 1 hour before intercourse prn, Disp: 50 tablet, Rfl: 12    valACYclovir (VALTREX) 1000 MG tablet, Take 2 po bid x 1 day PRN fever blisters, Disp: 12 tablet, Rfl: 3    Allergies: Patient has no known allergies.    Physical Exam  Constitutional:       General: He is not in acute distress.     Appearance: He is not toxic-appearing.   HENT:      Head: Normocephalic and atraumatic.      Right Ear: Tympanic membrane, ear canal and external ear normal.      Left Ear: Tympanic membrane, ear canal and external ear normal.      Nose: Nose normal.      Mouth/Throat:      Mouth: Mucous membranes are moist.      Pharynx: Oropharynx is  clear.   Eyes:      General: No scleral icterus.     Extraocular Movements: Extraocular movements intact.      Conjunctiva/sclera: Conjunctivae normal.      Pupils: Pupils are equal, round, and reactive to light.   Neck:      Vascular: No carotid bruit.   Cardiovascular:      Rate and Rhythm: Normal rate and regular rhythm.      Pulses: Normal pulses.      Heart sounds: Normal heart sounds. No murmur heard.     No gallop.   Pulmonary:      Effort: Pulmonary effort is normal.      Breath sounds: Normal breath sounds. No wheezing or rales.   Chest:      Chest wall: No tenderness.   Abdominal:      General: Bowel sounds are normal. There is no distension.      Palpations: Abdomen is soft. There is no mass.      Tenderness: There is no abdominal tenderness. There is no guarding or rebound.   Musculoskeletal:         General: No swelling or deformity. Normal range of motion.      Cervical back: Normal range of motion. No rigidity.      Right lower leg: No edema.      Left lower leg: No edema.   Lymphadenopathy:      Cervical: No cervical adenopathy.   Skin:     General: Skin is warm and dry.      Capillary Refill: Capillary refill takes less than 2 seconds.      Coloration: Skin is not jaundiced or pale.      Findings: No erythema or rash.   Neurological:      General: No focal deficit present.      Mental Status: He is alert and oriented to person, place, and time.      Cranial Nerves: No cranial nerve deficit.      Motor: No weakness.      Coordination: Coordination normal.      Gait: Gait normal.   Psychiatric:         Mood and Affect: Mood normal.         Behavior: Behavior normal.         Thought Content: Thought content normal.         Judgment: Judgment normal.                   Assessment and Plan   Diagnoses and all orders for this visit:    1. Encounter for general adult medical examination with abnormal findings (Primary)  -     CBC & Differential  -     Comprehensive Metabolic Panel  -     Hemoglobin A1c  -      Lipid Panel  -     TSH  Healthy lifestyle measures including healthy diet regular exercise and weight reduction were discussed.  Preventive healthcare measures were also discussed.  The patient did very well with Wegovy and would like to get back on that or Zepbound, but unfortunately his current insurance does not cover those.  He says that his insurance status may be changing in a few months and if so, and he can contact me to restart therapy with 1 of those agents as he does not have any medical contraindications to them.  The patient is also contemplating bariatric surgery in the future, and he can certainly contact me for referral somewhere if he desires.  We will check labs and refill his current medications and I will see him back in 1 year or sooner if needed  2. Prediabetes    3. Dyslipidemia    4. Erectile dysfunction, unspecified erectile dysfunction type    5. Fever blister    6. Encounter for long-term (current) use of high-risk medication    7. Morbid (severe) obesity due to excess calories  See above, patient did well initially with Wegovy and lost 20 to 25 pounds but unfortunately could not afford to stay on it  8. Other fatigue  -     TSH    Other orders  -     sildenafil (REVATIO) 20 MG tablet; Take 1 to 5 (20mg to 100mg) about 1 hour before intercourse prn  Dispense: 50 tablet; Refill: 12  -     valACYclovir (VALTREX) 1000 MG tablet; Take 2 po bid x 1 day PRN fever blisters  Dispense: 12 tablet; Refill: 3            Follow Up   Return in about 1 year (around 1/7/2026) for Annual physical.  Patient was given instructions and counseling regarding his condition or for health maintenance advice. Please see specific information pulled into the AVS if appropriate.     Jewel Kendrick MD

## 2025-01-08 DIAGNOSIS — R74.8 ELEVATED LIVER ENZYMES: Primary | ICD-10-CM

## 2025-01-08 LAB
ALBUMIN SERPL-MCNC: 4.6 G/DL (ref 4.1–5.1)
ALP SERPL-CCNC: 79 IU/L (ref 44–121)
ALT SERPL-CCNC: 53 IU/L (ref 0–44)
AST SERPL-CCNC: 30 IU/L (ref 0–40)
BASOPHILS # BLD AUTO: 0 X10E3/UL (ref 0–0.2)
BASOPHILS NFR BLD AUTO: 1 %
BILIRUB SERPL-MCNC: 0.4 MG/DL (ref 0–1.2)
BUN SERPL-MCNC: 13 MG/DL (ref 6–20)
BUN/CREAT SERPL: 14 (ref 9–20)
CALCIUM SERPL-MCNC: 9.6 MG/DL (ref 8.7–10.2)
CHLORIDE SERPL-SCNC: 103 MMOL/L (ref 96–106)
CHOLEST SERPL-MCNC: 180 MG/DL (ref 100–199)
CO2 SERPL-SCNC: 24 MMOL/L (ref 20–29)
CREAT SERPL-MCNC: 0.9 MG/DL (ref 0.76–1.27)
EGFRCR SERPLBLD CKD-EPI 2021: 112 ML/MIN/1.73
EOSINOPHIL # BLD AUTO: 0.2 X10E3/UL (ref 0–0.4)
EOSINOPHIL NFR BLD AUTO: 3 %
ERYTHROCYTE [DISTWIDTH] IN BLOOD BY AUTOMATED COUNT: 12.4 % (ref 11.6–15.4)
GLOBULIN SER CALC-MCNC: 2.9 G/DL (ref 1.5–4.5)
GLUCOSE SERPL-MCNC: 92 MG/DL (ref 70–99)
HBA1C MFR BLD: 5.4 % (ref 4.8–5.6)
HCT VFR BLD AUTO: 42.4 % (ref 37.5–51)
HDLC SERPL-MCNC: 39 MG/DL
HGB BLD-MCNC: 14.5 G/DL (ref 13–17.7)
IMM GRANULOCYTES # BLD AUTO: 0 X10E3/UL (ref 0–0.1)
IMM GRANULOCYTES NFR BLD AUTO: 0 %
LDLC SERPL CALC-MCNC: 118 MG/DL (ref 0–99)
LYMPHOCYTES # BLD AUTO: 2.7 X10E3/UL (ref 0.7–3.1)
LYMPHOCYTES NFR BLD AUTO: 31 %
MCH RBC QN AUTO: 30.9 PG (ref 26.6–33)
MCHC RBC AUTO-ENTMCNC: 34.2 G/DL (ref 31.5–35.7)
MCV RBC AUTO: 90 FL (ref 79–97)
MONOCYTES # BLD AUTO: 0.6 X10E3/UL (ref 0.1–0.9)
MONOCYTES NFR BLD AUTO: 7 %
NEUTROPHILS # BLD AUTO: 5 X10E3/UL (ref 1.4–7)
NEUTROPHILS NFR BLD AUTO: 58 %
PLATELET # BLD AUTO: 327 X10E3/UL (ref 150–450)
POTASSIUM SERPL-SCNC: 4.4 MMOL/L (ref 3.5–5.2)
PROT SERPL-MCNC: 7.5 G/DL (ref 6–8.5)
RBC # BLD AUTO: 4.69 X10E6/UL (ref 4.14–5.8)
SODIUM SERPL-SCNC: 139 MMOL/L (ref 134–144)
TRIGL SERPL-MCNC: 126 MG/DL (ref 0–149)
TSH SERPL DL<=0.005 MIU/L-ACNC: 1.95 UIU/ML (ref 0.45–4.5)
VLDLC SERPL CALC-MCNC: 23 MG/DL (ref 5–40)
WBC # BLD AUTO: 8.6 X10E3/UL (ref 3.4–10.8)

## 2025-02-10 ENCOUNTER — PATIENT MESSAGE (OUTPATIENT)
Dept: FAMILY MEDICINE CLINIC | Facility: CLINIC | Age: 40
End: 2025-02-10
Payer: COMMERCIAL